# Patient Record
Sex: MALE | Race: BLACK OR AFRICAN AMERICAN
[De-identification: names, ages, dates, MRNs, and addresses within clinical notes are randomized per-mention and may not be internally consistent; named-entity substitution may affect disease eponyms.]

---

## 2017-11-21 ENCOUNTER — HOSPITAL ENCOUNTER (EMERGENCY)
Dept: HOSPITAL 25 - ED | Age: 32
Discharge: HOME | End: 2017-11-21
Payer: SELF-PAY

## 2017-11-21 VITALS — DIASTOLIC BLOOD PRESSURE: 81 MMHG | SYSTOLIC BLOOD PRESSURE: 121 MMHG

## 2017-11-21 DIAGNOSIS — S61.212A: Primary | ICD-10-CM
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
## 2017-11-21 DIAGNOSIS — F17.210: ICD-10-CM
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
## 2017-11-21 PROCEDURE — 12001 RPR S/N/AX/GEN/TRNK 2.5CM/<: CPT

## 2017-11-21 PROCEDURE — 99281 EMR DPT VST MAYX REQ PHY/QHP: CPT

## 2017-11-21 NOTE — ED
Laceration/Wound HPI





- HPI Summary


HPI Summary: 


31M presents with laceration of right middle finger while cleaning car today.  

He has 1cm half moon with no active bleeding on his knuckle.  He is right 

handed.  No numbness or tingling. has full ROM of hand. is right handed. 

Tetanus was within last 5 years.  He denies any foreign body. 








- History of Current Complaint


Stated Complaint: RT HAND LAC


Time Seen by Provider: 11/21/17 14:15


Pain Intensity: 0





- Allergy/Home Medications


Allergies/Adverse Reactions: 


 Allergies











Allergy/AdvReac Type Severity Reaction Status Date / Time


 


No Known Allergies Allergy   Verified 09/06/16 20:53














PMH/Surg Hx/FS Hx/Imm Hx


Endocrine/Hematology History: 


   Denies: Hx Anticoagulant Therapy


Cardiovascular History: 


   Denies: Hx Myocardial Infarction





- Surgical History


Surgery Procedure, Year, and Place: Open heart 3 years of age for "hole in 

heart."


Infectious Disease History: No


Infectious Disease History: 


   Denies: Hx Clostridium Difficile, Hx Hepatitis, Hx Human Immunodeficiency 

Virus (HIV), Hx of Known/Suspected MRSA, Hx Shingles, Hx Tuberculosis, Hx Known/

Suspected VRE, Hx Known/Suspected VRSA, History Other Infectious Disease, 

Traveled Outside the [place] in Last 30 Days





- Family History


Known Family History: Positive: Cardiac Disease





- Social History


Alcohol Use: None


Substance Use Type: Reports: Marijuana


Smoking Status (MU): Current Every Day Smoker


Type: Cigarettes


Amount Used/How Often: 1 ppd


Length of Time of Smoking/Using Tobacco: 15 years


Have You Smoked in the Last Year: Yes





Review of Systems


Negative: Fever


Negative: Chest Pain


Negative: Shortness Of Breath


Positive: Other - laceration right hand


All Other Systems Reviewed And Are Negative: Yes





Physical Exam


Triage Information Reviewed: Yes


Vital Signs On Initial Exam: 


 Initial Vitals











Temp Pulse Resp BP Pulse Ox


 


 98.6 F   66   18   121/81   99 


 


 11/21/17 13:50  11/21/17 13:50  11/21/17 13:50  11/21/17 13:50  11/21/17 13:50











Vital Signs Reviewed: Yes


Appearance: Positive: Well-Appearing


Skin: Positive: Warm, Dry, Other - 1cm semicircular lac of right middle finger


Head/Face: Positive: Normal Head/Face Inspection


Eyes: Positive: Normal, Conjunctiva Clear


Respiratory/Lung Sounds: Positive: Clear to Auscultation, Breath Sounds Present


Cardiovascular: Positive: Normal, RRR


Musculoskeletal: Positive: Strength/ROM Intact - right hand, Other - good pulses

, capillary refill<2 secs


Neurological: Positive: Normal


Psychiatric: Positive: Normal





- Linda Coma Scale


Coma Scale Total: 15





Procedures





- Laceration/Wound Repair


  ** 1


Location: Other - right hand


Description: Linear


Anesthesia: Local, 1.0%


Length, Depth and Shape: 1cm by 1/4cm


Betadine Prep?: Yes


Irrigated w/ Saline (ccs): 100


Closure: Single Layer


Suture Type: Prolene - 4-0


Number of Sutures: 2


Layer Closure?: No


Sterile Dressing Applied?: No





Diagnostics





- Vital Signs


 Vital Signs











  Temp Pulse Resp BP Pulse Ox


 


 11/21/17 13:50  98.6 F  66  18  121/81  99














- Laboratory


Lab Statement: Any lab studies that have been ordered have been reviewed, and 

results considered in the medical decision making process.





Laceration Repair Course/Dx





- Course


Course Of Treatment: 31M presents with laceration of right middle finger while 

cleaning car today.  He has 1cm half moon with no active bleeding on his 

knuckle.  He is right handed.  No numbness or tingling. has full ROM of hand. 

is right handed. Tetanus was within last 5 years.  He denies any foreign body. 

on exam full ROM of hand. neurovascular intact. cleaned area and placed 2 

sutures. patient understand and agrees with plan.





- Differential Dx


Differental Diagnoses: Abrasion, Avulsion, Laceration





- Clinical Impression


Provider Diagnoses: 


 Laceration of right hand








Discharge





- Discharge Plan


Condition: Good


Disposition: HOME


Patient Education Materials:  Care For Your Stitches (ED)


Referrals: 


[facility] [place] [facility]FERRAL [Outside]


Additional Instructions: 


Keep area clean and dry for 24 hours


Take Tylenol or ibuprofen for pain every 6 hours


Return to ED, urgent care, or primary for suture removal in 10-14  days


Return to ED if develop signs of infection such as fever, spreading redness, or 

pus formation

## 2019-01-25 ENCOUNTER — HOSPITAL ENCOUNTER (EMERGENCY)
Dept: HOSPITAL 25 - ED | Age: 34
Discharge: LEFT BEFORE DISCHARGE | End: 2019-01-25
Payer: COMMERCIAL

## 2019-01-25 VITALS — SYSTOLIC BLOOD PRESSURE: 142 MMHG | DIASTOLIC BLOOD PRESSURE: 73 MMHG

## 2019-01-25 DIAGNOSIS — M54.9: Primary | ICD-10-CM

## 2019-01-25 DIAGNOSIS — Z53.21: ICD-10-CM

## 2019-03-11 ENCOUNTER — HOSPITAL ENCOUNTER (EMERGENCY)
Dept: HOSPITAL 25 - ED | Age: 34
LOS: 1 days | Discharge: HOME | End: 2019-03-12
Payer: COMMERCIAL

## 2019-03-11 VITALS — DIASTOLIC BLOOD PRESSURE: 102 MMHG | SYSTOLIC BLOOD PRESSURE: 152 MMHG

## 2019-03-11 DIAGNOSIS — K04.7: Primary | ICD-10-CM

## 2019-03-11 DIAGNOSIS — F17.210: ICD-10-CM

## 2019-03-11 DIAGNOSIS — Z88.0: ICD-10-CM

## 2019-03-11 PROCEDURE — 99281 EMR DPT VST MAYX REQ PHY/QHP: CPT

## 2019-06-24 ENCOUNTER — HOSPITAL ENCOUNTER (EMERGENCY)
Dept: HOSPITAL 25 - ED | Age: 34
Discharge: HOME | End: 2019-06-24
Payer: COMMERCIAL

## 2019-06-24 VITALS — DIASTOLIC BLOOD PRESSURE: 70 MMHG | SYSTOLIC BLOOD PRESSURE: 123 MMHG
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
## 2019-06-24 DIAGNOSIS — Z88.0: ICD-10-CM

## 2019-06-24 DIAGNOSIS — S30.0XXA: Primary | ICD-10-CM

## 2019-06-24 DIAGNOSIS — F17.210: ICD-10-CM
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
## 2019-06-24 DIAGNOSIS — R07.9: ICD-10-CM

## 2019-06-24 LAB
ALBUMIN SERPL BCG-MCNC: 4.3 G/DL (ref 3.2–5.2)
ALBUMIN/GLOB SERPL: 1.7 {RATIO} (ref 1–3)
ALP SERPL-CCNC: 49 U/L (ref 34–104)
ALT SERPL W P-5'-P-CCNC: 15 U/L (ref 7–52)
ANION GAP SERPL CALC-SCNC: 6 MMOL/L (ref 2–11)
AST SERPL-CCNC: 17 U/L (ref 13–39)
BASOPHILS # BLD AUTO: 0 10^3/UL (ref 0–0.2)
BUN SERPL-MCNC: 12 MG/DL (ref 6–24)
BUN/CREAT SERPL: 12.4 (ref 8–20)
CALCIUM SERPL-MCNC: 9.5 MG/DL (ref 8.6–10.3)
CHLORIDE SERPL-SCNC: 106 MMOL/L (ref 101–111)
EOSINOPHIL # BLD AUTO: 0.3 10^3/UL (ref 0–0.6)
GLOBULIN SER CALC-MCNC: 2.5 G/DL (ref 2–4)
GLUCOSE SERPL-MCNC: 89 MG/DL (ref 70–100)
HCO3 SERPL-SCNC: 26 MMOL/L (ref 22–32)
HCT VFR BLD AUTO: 44 % (ref 42–52)
HGB BLD-MCNC: 15.5 G/DL (ref 14–18)
LYMPHOCYTES # BLD AUTO: 3.1 10^3/UL (ref 1–4.8)
MCH RBC QN AUTO: 32 PG (ref 27–31)
MCHC RBC AUTO-ENTMCNC: 35 G/DL (ref 31–36)
MCV RBC AUTO: 91 FL (ref 80–94)
MONOCYTES # BLD AUTO: 0.6 10^3/UL (ref 0–0.8)
NEUTROPHILS # BLD AUTO: 3.8 10^3/UL (ref 1.5–7.7)
NRBC # BLD AUTO: 0 10^3/UL
NRBC BLD QL AUTO: 0
PLATELET # BLD AUTO: 255 10^3/UL (ref 150–450)
POTASSIUM SERPL-SCNC: 3.5 MMOL/L (ref 3.5–5)
PROT SERPL-MCNC: 6.8 G/DL (ref 6.4–8.9)
RBC # BLD AUTO: 4.81 10^6 /UL (ref 4.18–5.48)
SODIUM SERPL-SCNC: 138 MMOL/L (ref 135–145)
WBC # BLD AUTO: 7.8 10^3/UL (ref 3.5–10.8)

## 2019-06-24 PROCEDURE — 74177 CT ABD & PELVIS W/CONTRAST: CPT

## 2019-06-24 PROCEDURE — 70450 CT HEAD/BRAIN W/O DYE: CPT

## 2019-06-24 PROCEDURE — 86901 BLOOD TYPING SEROLOGIC RH(D): CPT

## 2019-06-24 PROCEDURE — 80053 COMPREHEN METABOLIC PANEL: CPT

## 2019-06-24 PROCEDURE — 72131 CT LUMBAR SPINE W/O DYE: CPT

## 2019-06-24 PROCEDURE — G0480 DRUG TEST DEF 1-7 CLASSES: HCPCS

## 2019-06-24 PROCEDURE — 93005 ELECTROCARDIOGRAM TRACING: CPT

## 2019-06-24 PROCEDURE — 83605 ASSAY OF LACTIC ACID: CPT

## 2019-06-24 PROCEDURE — 96375 TX/PRO/DX INJ NEW DRUG ADDON: CPT

## 2019-06-24 PROCEDURE — 72128 CT CHEST SPINE W/O DYE: CPT

## 2019-06-24 PROCEDURE — 86850 RBC ANTIBODY SCREEN: CPT

## 2019-06-24 PROCEDURE — 85025 COMPLETE CBC W/AUTO DIFF WBC: CPT

## 2019-06-24 PROCEDURE — 96374 THER/PROPH/DIAG INJ IV PUSH: CPT

## 2019-06-24 PROCEDURE — 86900 BLOOD TYPING SEROLOGIC ABO: CPT

## 2019-06-24 PROCEDURE — 72125 CT NECK SPINE W/O DYE: CPT

## 2019-06-24 PROCEDURE — 36415 COLL VENOUS BLD VENIPUNCTURE: CPT

## 2019-06-24 PROCEDURE — 80320 DRUG SCREEN QUANTALCOHOLS: CPT

## 2019-06-24 PROCEDURE — 71260 CT THORAX DX C+: CPT

## 2019-06-24 PROCEDURE — 99283 EMERGENCY DEPT VISIT LOW MDM: CPT
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
## 2019-06-24 NOTE — ED
Progress





- Progress Note


Progress Note: 


Patient is received as a sign out from Dr. Pierre to Dr. Christensen at 1900 6/24/19 

shift change pending Brain CT, Chest/Abd/Pel CT, Thoracic Spine CT, Lumbar 

Spine CT, Cervical Spine CT. 





LUMBAR SPINE CT IMPRESSION: 


No acute findings. 


THIS REPORT WAS REVIEWED BY DR. CHRISTENSEN.





BRAIN CT IMPRESSION: 


No acute intracranial abnormality demonstrated by CT. 


THIS REPORT WAS REVIEWED BY DR. CHRISTENSEN.





CT CHEST IMPRESSION: 


No acute findings. 


CT ABD/PEL IMPRESSION: 


No acute findings. 


THIS REPORT WAS REVIEWED BY DR. CHRISTENSEN.





CERVICAL SPINE CT IMPRESSION: 


No acute cervical spinal injury demonstrated by CT. 


THIS REPORT WAS REVIEWED BY DR. CHRISTENSEN.





CT THORACIC SPINE IMPRESSION: 


No acute findings.


THIS REPORT WAS REVIEWED BY DR. CHRISTENSEN.





Results of labs and tests were discussed with the patient. Patient will be 

discharged to home and follow up with PCP. Strict return precautions were 

given. Patient is agreeable with this plan. 








- EKG/XRAY/CT


CT: see above





Course/Dx





- Course


Course Of Treatment: Patient is received as a sign out from Dr. Pierre to Dr. Christensen at 1900 6/24/19 shift change pending Brain CT, Chest/Abd/Pel CT, Thoracic 

Spine CT, Lumbar Spine CT, Cervical Spine CT.  LUMBAR SPINE CT IMPRESSION:  No 

acute findings.  THIS REPORT WAS REVIEWED BY DR. CHRISTENSEN.  BRAIN CT IMPRESSION:  

No acute intracranial abnormality demonstrated by CT.  THIS REPORT WAS REVIEWED 

BY DR. CHRISTENSEN.  CT CHEST IMPRESSION:  No acute findings.  CT ABD/PEL IMPRESSION:

  No acute findings.  THIS REPORT WAS REVIEWED BY DR. CHRISTENSEN.  CERVICAL SPINE CT 

IMPRESSION:  No acute cervical spinal injury demonstrated by CT.  THIS REPORT 

WAS REVIEWED BY DR. CHRISTENSEN.  CT THORACIC SPINE IMPRESSION:  No acute findings.  

THIS REPORT WAS REVIEWED BY DR. CHRISTENSEN.  Results of labs and tests were 

discussed with the patient. Patient will be discharged to home and follow up 

with PCP. Strict return precautions were given. Patient is agreeable with this 

plan.





- Diagnoses


Provider Diagnoses: 


 Contusion








Discharge





- Sign-Out/Discharge


Documenting (check all that apply): Patient Departure - discharge


Patient Received Moderate/Deep Sedation with Procedure: No





- Discharge Plan


Condition: Stable


Disposition: HOME


Prescriptions: 


oxyCODONE/Acetamin 5/325 MG* [Percocet 5/325 TAB*] 1 tab PO Q6H PRN #10 tab MDD 

4


 PRN Reason: Pain


Patient Education Materials:  Contusion in Adults (ED)


Referrals: 


[facility] [place]YSICIAN REFERRAL [Outside] - 3 Days


Additional Instructions: 


PLEASE RETURN TO THE ED IMMEDIATELY FOR WORSENING OR CONCERNING SYMPTOMS.FOLLOW 

UP WITH YOUR PRIMARY CARE PHYSICIAN WITHIN THREE DAYS. 





- Attestation Statements


Document Initiated by Scribe: Yes


Documenting Scribe: BOB VALLE


Provider For Whom Scribe is Documenting (Include Credential): ZEYAD CHRISTENSEN MD


Scribe Attestation: 


I, BOB VALLE, scribed for ZEYAD CHRISTENSEN MD on 06/24/19 at 2057. 


Status of Scribe Document: Ready

## 2019-06-26 ENCOUNTER — HOSPITAL ENCOUNTER (EMERGENCY)
Dept: HOSPITAL 25 - ED | Age: 34
Discharge: HOME | End: 2019-06-26
Payer: COMMERCIAL

## 2019-06-26 VITALS — SYSTOLIC BLOOD PRESSURE: 115 MMHG | DIASTOLIC BLOOD PRESSURE: 66 MMHG

## 2019-06-26 DIAGNOSIS — M62.838: Primary | ICD-10-CM
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
## 2019-06-26 PROCEDURE — 99282 EMERGENCY DEPT VISIT SF MDM: CPT
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
## 2019-07-23 ENCOUNTER — HOSPITAL ENCOUNTER (EMERGENCY)
Dept: HOSPITAL 25 - ED | Age: 34
Discharge: LEFT BEFORE DISCHARGE | End: 2019-07-23
Payer: COMMERCIAL

## 2019-07-23 VITALS — DIASTOLIC BLOOD PRESSURE: 64 MMHG | SYSTOLIC BLOOD PRESSURE: 94 MMHG

## 2019-07-23 DIAGNOSIS — Z53.21: Primary | ICD-10-CM

## 2019-07-23 PROCEDURE — 99281 EMR DPT VST MAYX REQ PHY/QHP: CPT

## 2024-04-16 NOTE — ED
Throat Pain/Nasal Congestion





- HPI Summary


HPI Summary: 


The patient is a 32 y/o M presenting to [facility] with a chief complaint of lower 

left molar dental pain starting at 1700 last night. He states that the sharp 

pain has been constant, and the pain is rated 10/10 in severity. He denies 

fever. There are no aggravating or alleviating factors.





- History of Current Complaint


Chief Complaint: EDDentalPain


Time Seen by Provider: 03/12/19 01:53


Hx Obtained From: Patient


Onset/Duration: Sudden Onset - at 1700 last night, Still Present


Severity: Severe





- Allergies/Home Medications


Allergies/Adverse Reactions: 


 Allergies











Allergy/AdvReac Type Severity Reaction Status Date / Time


 


penicillin V [From Pen-Venel K] Allergy  Diarrhea Verified 03/11/19 23:45














PMH/Surg Hx/FS Hx/Imm Hx


Endocrine/Hematology History: 


   Denies: Hx Anticoagulant Therapy


Cardiovascular History: 


   Denies: Hx Myocardial Infarction


Opthamlomology History: 


   Denies: Hx Legally Blind


EENT History: 


   Denies: Hx Deafness





- Surgical History


Surgery Procedure, Year, and Place: Open heart 3 years of age for "hole in 

heart."





- Immunization History


Date of Tetanus Vaccine: unk


Date of Influenza Vaccine: none


Infectious Disease History: No


Infectious Disease History: 


   Denies: Hx Clostridium Difficile, Hx Hepatitis, Hx Human Immunodeficiency 

Virus (HIV), Hx of Known/Suspected MRSA, Hx Shingles, Hx Tuberculosis, Hx Known/

Suspected VRE, Hx Known/Suspected VRSA, History Other Infectious Disease, 

Traveled Outside the [place] in Last 30 Days





- Family History


Known Family History: Positive: Cardiac Disease





- Social History


Alcohol Use: None


Substance Use Type: Reports: Marijuana


Smoking Status (MU): Current Every Day Smoker


Type: Cigarettes


Amount Used/How Often: 1 ppd


Length of Time of Smoking/Using Tobacco: 15 years


Have You Smoked in the Last Year: Yes





Review of Systems


Negative: Fever


Positive: Dental Pain - lower left molars


All Other Systems Reviewed And Are Negative: Yes





Physical Exam





- Summary


Physical Exam Summary: 


Appearance: Well-appearing, Well-nourished, lying in bed comfortable


Skin: Warm, dry, no obvious rash


Eyes: sclera anicteric, no conjunctival pallor


ENT: mucous membranes moist, diffusely poor dentition, gingival swelling on the 

left lower molars


Neck: deferred


Respiratory: No signs of respiratory distress


Cardiovascular: Appears well perfused, pulses are nml


Abdomen: deferred


Musculoskeletal: Moving all 4 extremities without obvious discomfort


Neurological: Awake  and alert, mentation is normal, speech is fluent and 

appropriate


Psychiatric: affect is normal, does not appear anxious or depressed


Triage Information Reviewed: Yes


Vital Signs On Initial Exam: 


 Initial Vitals











Temp Pulse Resp BP Pulse Ox


 


 98.3 F   69   18   152/102   99 


 


 03/11/19 23:44  03/11/19 23:44  03/11/19 23:44  03/11/19 23:44  03/11/19 23:44











Vital Signs Reviewed: Yes





Diagnostics





- Vital Signs


 Vital Signs











  Temp Pulse Resp BP Pulse Ox


 


 03/11/19 23:44  98.3 F  69  18  152/102  99














- Laboratory


Lab Statement: Any lab studies that have been ordered have been reviewed, and 

results considered in the medical decision making process.





Re-Evaluation





- Re-Evaluation


  ** First Eval


Re-Evaluation Time: 04:00


Change: Improved


Comment: The drainage of the abscess and administered local anesthetics made 

the patient's pain reside.





EENT Course/Dx





- Course


Course Of Treatment: The patient is a 32 y/o M presenting to [facility] with a chief 

complaint of lower left molar dental pain starting at 1700 last night. He 

states that the sharp pain has been constant, and the pain is rated 10/10 in 

severity. He denies fever. Upon physical examination, there is diffusely poor 

dentition and gingival swelling on the left lower molars. The abscess started 

to drain into his mouth while in the ED, allowing his pain to be relieved. In 

the ED course, the patient was administered Marcaine with Epi and Lidocaine/

Epi. He is diagnosed with dental abscess. He agrees with plan for discharge 

with prescription for Clindamycin and follow up with dentist. He understands 

the need for return to the ED if necessary.





- Diagnoses


Provider Diagnoses: 


 Dental abscess








Discharge





- Sign-Out/Discharge


Documenting (check all that apply): Patient Departure - Patient will be 

discharged home.


Patient Received Moderate/Deep Sedation with Procedure: No





- Discharge Plan


Condition: Improved


Disposition: HOME


Prescriptions: 


Clindamycin Cap(NF) [Clindamycin Cap 300 mg Cap(NF)] 300 mg PO TID #30 cap


Patient Education Materials:  Dental Abscess (ED)


Referrals: 


No Primary Care Phys,NOPCP [Primary Care Provider] - 


Additional Instructions: 


You will need to see a dentist for this once the infection gets better, so call 

for an appt this morning.





- Billing Disposition and Condition


Condition: IMPROVED


Disposition: Home





- Attestation Statements


Document Initiated by Morgan: Yes


Documenting Scribe: Orly Guadalupe


Provider For Whom Morgan is Documenting (Include Credential): Dr. Beni Webster MD


Scribe Attestation: 


I, Orly Guadalupe scribed for Dr. Beni Webster MD on 03/13/19 at 0425. 


Scribe Documentation Reviewed: Yes


Provider Attestation: 


The documentation as recorded by the Orly steen accurately reflects 

the service I personally performed and the decisions made by me, Dr. Beni Webster MD


Status of Scribe Document: Viewed [FreeTextEntry2] : [facility] BL knees HYm#2 inj